# Patient Record
Sex: MALE | Race: WHITE | NOT HISPANIC OR LATINO | URBAN - METROPOLITAN AREA
[De-identification: names, ages, dates, MRNs, and addresses within clinical notes are randomized per-mention and may not be internally consistent; named-entity substitution may affect disease eponyms.]

---

## 2022-12-15 ENCOUNTER — TELEPHONE (OUTPATIENT)
Dept: FAMILY MEDICINE CLINIC | Facility: CLINIC | Age: 42
End: 2022-12-15

## 2022-12-15 ENCOUNTER — READMISSION MANAGEMENT (OUTPATIENT)
Dept: CALL CENTER | Facility: HOSPITAL | Age: 42
End: 2022-12-15

## 2022-12-15 NOTE — OUTREACH NOTE
Prep Survey    Flowsheet Row Responses   [facility]? Non-[facility]   Is LACE score < 7 ? Non-BH Discharge   Eligibility [facility]   [facility]   Date of Discharge 12/15/22   Discharge Disposition Home or Self Care   Discharge diagnosis Unknown   Does the patient have one of the following disease processes/diagnoses(primary or secondary)? Other   Comments regarding appointments new appt with PCP   Prep survey completed? Yes          CARLOS WILLIS - Registered Nurse

## 2022-12-15 NOTE — TELEPHONE ENCOUNTER
Caller: ERROL JEAN    Relationship to patient: Mother    Best call back number: [phone]    New or established patient?  [x] New  [] Established    Date of discharge: 12/15    Facility [facility] from: [facility]    PATIENT HAS A NEW PATIENT APPT WITH DR SPRINGER ON 1/13/2023

## 2022-12-16 ENCOUNTER — TRANSITIONAL CARE MANAGEMENT TELEPHONE ENCOUNTER (OUTPATIENT)
Dept: CALL CENTER | Facility: HOSPITAL | Age: 42
End: 2022-12-16

## 2022-12-16 NOTE — OUTREACH NOTE
Call Center TCM Note    Flowsheet Row Responses   [facility] patient discharged from? Non-BH   Does the patient have one of the following disease processes/diagnoses(primary or secondary)? Other   TCM attempt successful? No   Unsuccessful attempts Attempt 1          Abigail Martin LPN    12/16/2022, 10:44 EST

## 2022-12-16 NOTE — OUTREACH NOTE
Call Center TCM Note    Flowsheet Row Responses   [facility] facility patient discharged from? Non-BH   Does the patient have one of the following disease processes/diagnoses(primary or secondary)? Other   TCM attempt successful? No   Unsuccessful attempts Attempt 2          Abigail Martin LPN    12/16/2022, 15:31 EST

## 2022-12-19 ENCOUNTER — TRANSITIONAL CARE MANAGEMENT TELEPHONE ENCOUNTER (OUTPATIENT)
Dept: CALL CENTER | Facility: HOSPITAL | Age: 42
End: 2022-12-19
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
